# Patient Record
Sex: MALE | Race: WHITE
[De-identification: names, ages, dates, MRNs, and addresses within clinical notes are randomized per-mention and may not be internally consistent; named-entity substitution may affect disease eponyms.]

---

## 2017-01-13 ENCOUNTER — HOSPITAL ENCOUNTER (EMERGENCY)
Dept: HOSPITAL 36 - ER | Age: 52
Discharge: HOME | End: 2017-01-13
Payer: MEDICAID

## 2017-01-13 DIAGNOSIS — F17.200: ICD-10-CM

## 2017-01-13 DIAGNOSIS — H92.02: ICD-10-CM

## 2017-01-13 DIAGNOSIS — K02.9: Primary | ICD-10-CM

## 2017-01-13 PROCEDURE — 99283 EMERGENCY DEPT VISIT LOW MDM: CPT

## 2017-01-13 PROCEDURE — Z7610: HCPCS

## 2017-01-13 PROCEDURE — 96372 THER/PROPH/DIAG INJ SC/IM: CPT

## 2017-01-13 PROCEDURE — Z7502: HCPCS

## 2017-01-13 NOTE — ED PHYSICIAN CHART
Chief Complaint/HPI





- Patient Information


Date Seen:: 01/13/17


Time Seen:: 02:40


Chief Complaint:: dental pain and right ear pain


History of Present Illness:: 





increased for one week has had pain tooth 7 and right ear pain.


Allergies:: 


 Allergies











Allergy/AdvReac Type Severity Reaction Status Date / Time


 


No Known Allergies Allergy   Verified 08/02/16 03:35











Historian:: Patient


Review:: Nurse's Note Reviewed





Review of Systems





- Review of Systems


General/Constitutional: No fever, No chills


Skin: No skin lesions


Head: No headache


Eyes: No loss of vision


ENT: Other


Neck: No neck pain, No swelling


Cardio Vascular: No chest pain


Pulmonary: No SOB


GI: No nausea, No vomiting, No diarrhea


G/U: No dysuria


Musculoskeletal: No bone or joint pain


Endocrine: No polyuria, No polydipsia


Psychiatric: No prior psych history


Hematopoietic: No bruising





Past Medical History





- Past Medical History


Past Medical History: No significant medical hx


Family History: Diabetes Melitus


Social History: Smoker, No Alcohol


Surgical History: None


Psychiatricy History: None





Family Medical History





- Family Member


  ** Mother


History Unknown: Yes





Physical Exam





- Physical Examination


General/Constitutional: Well-developed, well-nourished, Alert


Head: Atraumatic


Eyes: Lids, conjuctiva normal


Skin: Nl inspection


ENMT: External ears, nose nl, TM canals nl, Nasal exam nl, Oropharynx nl, 

Tonsils nl


Other ENMT comments:: 





poor dental hygiene; tooth 7 carious to gum line


Neck: No nuchal rigidity


Respiratory: Nl effort/Exclusion, Clear to Auscultation


Cardio Vascular: RRR


GI: No tenderness/rebounding/guarding


: No CVA tenderness


Extremities: No tenderness or effusion


Neuro/Psych: Alert/oriented





Assessment





- Assessment


General Assessment: 





right ear pain is from radiation of dental pain





ED Septic Shock





- .


Is Septic Shock (SBP<90, OR Lactate>4 mmol\L) present?: No





Reassessment (Disposition)





- Diagnosis


Diagnosis:: 





dental caries and dental pain





- Aftercare/Follow up Instructions


Medication Prescribed:: 





amoxicillin 500 mg TID for 10 days; Ibuprofen 400 mg TID x 10 days with meals





- Patient Disposition


Discharge/Transfer:: Home


Condition at Disposition:: Stable, Improved

## 2017-06-08 ENCOUNTER — HOSPITAL ENCOUNTER (EMERGENCY)
Dept: HOSPITAL 36 - ER | Age: 52
Discharge: HOME | End: 2017-06-08
Payer: MEDICAID

## 2017-06-08 DIAGNOSIS — R51: Primary | ICD-10-CM

## 2017-06-08 DIAGNOSIS — A49.02: ICD-10-CM

## 2017-06-08 DIAGNOSIS — F17.200: ICD-10-CM

## 2017-06-08 PROCEDURE — 99283 EMERGENCY DEPT VISIT LOW MDM: CPT

## 2017-06-08 PROCEDURE — 96372 THER/PROPH/DIAG INJ SC/IM: CPT

## 2017-06-08 PROCEDURE — Z7502: HCPCS

## 2017-06-08 RX ADMIN — SULFAMETHOXAZOLE AND TRIMETHOPRIM ONE TAB: 800; 160 TABLET ORAL at 20:13

## 2017-06-08 NOTE — ED PHYSICIAN CHART
Chief Complaint/HPI





- Patient Information


Date Seen:: 06/08/17


Time Seen:: 19:15


Chief Complaint:: headache


History of Present Illness:: 





Patient developed a diffuse headache earlier today.  He noted a tender 

occipital nodule also.  Patient's had no chills or fever.


Allergies:: 


 Allergies











Allergy/AdvReac Type Severity Reaction Status Date / Time


 


No Known Allergies Allergy   Verified 08/02/16 03:35











Vitals:: 


 Vital Signs - 8 hr











  06/08/17





  19:00


 


Temp 98.7 F


 


HR 63


 


RR 16


 


/79


 


O2 Sat % 99











Historian:: Patient


Review:: Nurse's Note Reviewed





Review of Systems





- Review of Systems


General/Constitutional: No fever, No chills


Skin: No skin lesions


Head: Headache


Eyes: No loss of vision


ENT: No earache


Neck: No neck pain


Cardio Vascular: Palpitations, No palpitations


Pulmonary: No SOB, No cough


GI: No nausea, No vomiting


G/U: No dysuria


Musculoskeletal: No bone or joint pain


Psychiatric: No prior psych history, No depression, No anxiety


Hematopoietic: No bruising


Allergic/Immuno: No urticaria


Neurological: No syncope, No focal symptoms





Past Medical History





- Past Medical History


Past Medical History: No significant medical hx


Family History: Diabetes Melitus


Social History: Smoker, No Alcohol


Surgical History: None


Psychiatricy History: None


Medication: None





Family Medical History





- Family Member


  ** Mother


History Unknown: Yes


Ethnicity: Non-


Hx Family Cancer: No


Hx Family Hypertension: No





  ** Grandmother


Hx Family Diabetes: Yes





  ** Father


Hx Family Diabetes: Yes





Physical Exam





- Physical Examination


General/Constitutional: Well-developed, well-nourished, Alert, No distress


Head: Atraumatic


Eyes: Lids, conjuctiva normal, PERRL


Other Eyes comments:: 





Difficult to visualize optic disks but right optic disc appeared normal with 

sharp edges


Other Skin comments:: 





1 cm soft ill-defined tender nodule left occiput


ENMT: External ears, nose nl, TM canals nl, Nasal exam nl


Other ENMT comments:: 





3 out 4 gum retraction


Neck: No nuchal rigidity


Respiratory: Nl effort/Exclusion, Clear to Auscultation, No Wheeze/Rhonchi/Rales


Cardio Vascular: RRR


GI: No tenderness/rebounding/guarding, No organomegaly, No hernia, Normal BS's, 

Nondistended, No mass/bruits, No McBurney tenderness


: No CVA tenderness


Extremities: Normal digits & nails


Neuro/Psych: Alert/oriented, No focal deficits


Misc: Normal back, No paraspinal tenderness





Assessment





- Assessment


General Assessment: 





Patient's headache is improved at 8 PM.  The ill-defined left occipital nodule 

is probably MRSA.  I gave the patient instructions on washing the area with 

soap and also washing the back of his neck and between the shoulder blades in 

the back which should suppress the MRSA.





ED Septic Shock





- .


Is Septic Shock (SBP<90, OR Lactate>4 mmol\L) present?: No





- <6hrs of presentation:


Vital Signs: 


 Vital Signs - 8 hr











  06/08/17





  19:00


 


Temp 98.7 F


 


HR 63


 


RR 16


 


/79


 


O2 Sat % 99














Reassessment (Disposition)





- Reassessment


Reassessment Condition:: Improved





- Diagnosis


Diagnosis:: 





Cephalgia; occipital scalp MRSA





- Aftercare/Follow up Instructions


Aftercare/Follow-Up Instructions:: Refer to Discharge Instructions





- Patient Disposition


Discharge/Transfer:: Home


Condition at Disposition:: Stable, Improved

## 2018-06-17 ENCOUNTER — HOSPITAL ENCOUNTER (EMERGENCY)
Dept: HOSPITAL 36 - ER | Age: 53
Discharge: HOME | End: 2018-06-17
Payer: MEDICAID

## 2018-06-17 DIAGNOSIS — K04.7: Primary | ICD-10-CM

## 2018-06-17 PROCEDURE — Z7502: HCPCS

## 2018-06-17 PROCEDURE — Z7610: HCPCS

## 2018-06-17 NOTE — ED PHYSICIAN CHART
ED Chief Complaint/HPI





- Patient Information


Date Seen:: 06/17/18


Time Seen:: 21:20


Chief Complaint:: RT TOOTH PAIN


History of Present Illness:: 





53 YR OLD MALE WITH TOOTH PAIN THROBBING SHARP FOR LAST 2 DAYS IS SCARED ABOUT 

GOING TO THE DENTIST PT [facility] POOR DENTITION AND HAVING RT JAW SWELLING AND GUM 

PAIN


Allergies:: 


 Allergies











Allergy/AdvReac Type Severity Reaction Status Date / Time


 


No Known Allergies Allergy   Verified 06/17/18 21:09











Vitals:: 


 Vital Signs - 8 hr











  06/17/18





  21:06


 


Temp 98.9 F


 


HR 78


 


RR 18


 


/75


 


O2 Sat % 96














ED Review of Systems





- Review of Systems


Other: RT CHEEK PAIN RT GUM PAIN





ED Past Medical History





- Past Medical History


Past Medical History: No significant medical hx





Family Medical History





- Family Member


  ** Mother


History Unknown: Yes


Ethnicity: Non-


Hx Family Cancer: No


Hx Family Hypertension: No





  ** Grandmother


History Unknown: Yes


Hx Family Diabetes: Yes





  ** Father


History Unknown: Yes


Hx Family Diabetes: Yes





ED Physical Exam





- Physical Examination


General/Constitutional: Awake, Well-developed, well-nourished, Alert, No 

distress, GCS 15, Non-toxic appearing, Ambulatory


Head: Atraumatic


Eyes: Lids, conjuctiva normal, PERRL, EOMI


Skin: Nl inspection, No rash, No skin lesions, No ecchymosis, Well hydrated, No 

lymphadenopathy


ENMT: External ears, nose nl, Nasal exam nl, Lips, teeth, gums nl


Other ENMT comments:: 





BAD DENTITION WITH MISSING TEETH AND CARIES AND RT CHEEK SWELLING


Neck: Nontender, Full ROM w/o pain, No JVD, No nuchal rigidity, No bruit, No 

mass, No stridor


Respiratory: Nl effort/Exclusion, Clear to Auscultation, No Wheeze/Rhonchi/Rales


Cardio Vascular: RRR, No murmur, gallop, rubs, NL S1 S2


GI: No tenderness/rebounding/guarding, No organomegaly, No hernia, Normal BS's, 

Nondistended, No mass/bruits, No McBurney tenderness


: No CVA tenderness


Extremities: No tenderness or effusion, Full ROM, normal strength in all 

extremities, No edema, Normal digits & nails


Neuro/Psych: Alert/oriented, DTR's symmetric, Normal sensory exam, Normal motor 

strength, Judgement/insight normal, Mood normal, Normal gait, No focal deficits


Misc: Normal back, No paraspinal tenderness





ED Assessment





- Assessment


General Assessment: 





TOOTH ABSCESS RT CHEEK SWELLING





ED Septic Shock





- .


Is Septic Shock (SBP<90, OR Lactate>4 mmol\L) present?: No





- <6hrs of presentation:


Vital Signs: 


 Vital Signs - 8 hr











  06/17/18





  21:06


 


Temp 98.9 F


 


HR 78


 


RR 18


 


/75


 


O2 Sat % 96














ED Reassessment (Disposition)





- Reassessment


Reassessment Condition:: Improved





- Diagnosis


Diagnosis:: 





RT TOOTH PAIN ABSCESS AND CHEEK SWELLING





- Aftercare/Follow up Instructions


Medication Prescribed:: 





MOTRIN  KEFLEX AND PT GIVEN 500MG KEFLEX TIMES 2 PRIOR TO D/C





- Patient Disposition


Discharge/Transfer:: Home





ED Discharge Plan





- Patient Disposition


Instructions:  Dental Caries, Abscessed Tooth, Easy-to-Read


Additional Instructions: 


FILL YOUR PRESCRIPTION AND TAKE IT AS DIRECTED. FOLLOW UP WITH YOUR DENTIST 

THIS WEEK.

## 2018-11-04 ENCOUNTER — HOSPITAL ENCOUNTER (EMERGENCY)
Dept: HOSPITAL 36 - ER | Age: 53
Discharge: HOME | End: 2018-11-04
Payer: MEDICAID

## 2018-11-04 DIAGNOSIS — G89.29: ICD-10-CM

## 2018-11-04 DIAGNOSIS — F15.90: ICD-10-CM

## 2018-11-04 DIAGNOSIS — M54.5: Primary | ICD-10-CM

## 2018-11-04 LAB
AMPHET UR-MCNC: POSITIVE NG/ML
BARBITURATES UR-MCNC: NEGATIVE UG/ML
BENZODIAZEPINES PNL UR: NEGATIVE
CANNABINOIDS SERPL QL CFM: NEGATIVE
COCAINE METAB.OTHER UR-MCNC: NEGATIVE NG/ML
METHADONE UR CFM-MCNC: NEGATIVE NG/ML
METHAMPHET UR QL: POSITIVE
OPIATES UR QL: NEGATIVE
PCP UR-MCNC: NEGATIVE UG/L
TRICYCLICS UR QL: NEGATIVE

## 2018-11-04 PROCEDURE — 99284 EMERGENCY DEPT VISIT MOD MDM: CPT

## 2018-11-04 PROCEDURE — Z7502: HCPCS

## 2018-11-04 PROCEDURE — 96372 THER/PROPH/DIAG INJ SC/IM: CPT

## 2018-11-04 PROCEDURE — 80307 DRUG TEST PRSMV CHEM ANLYZR: CPT

## 2018-11-04 NOTE — ED PHYSICIAN CHART
ED Chief Complaint/HPI





- Patient Information


Date Seen:: 11/04/18


Time Seen:: 13:13


Chief Complaint:: chronic low back pain


History of Present Illness:: 





chronic low back pain.  no h/o trauma.  went to a pain clinic 2 years ago.  

wants something for pain.  looks like he is on drugs right now.





no change in bowel or bladder.


Allergies:: 


 Allergies











Allergy/AdvReac Type Severity Reaction Status Date / Time


 


No Known Allergies Allergy   Verified 11/04/18 13:13











Vitals:: 


 Vital Signs - 8 hr











  11/04/18





  13:13


 


Temp 97.9 F


 


HR 73


 


RR 19


 


/74


 


O2 Sat % 98











Historian:: Patient


Review:: Nurse's Note Reviewed





ED Review of Systems





- Review of Systems


General/Constitutional: No fever, No chills, No weight loss, No weakness, No 

diaphoresis, No edema, No loss of appetite


Skin: No skin lesions, No rash, No bruising


Head: No headache, No light-headedness


Eyes: No loss of vision, No pain, No diplopia


ENT: No earache, No nasal drainage, No sore throat, No tinnitus


Neck: No neck pain, No swelling, No thyromegaly, No stiffness, No mass noted


Cardio Vascular: No chest pain, No palpitations, No PND, No orthopnea, No edema


Pulmonary: No SOB, No cough, No sputum, No wheezing


GI: No nausea, No vomiting, No diarrhea, No pain, No melena, No hematochezia, 

No constipation, No hematemesis


G/U: No dysuria, No frequency, No hematuria


Musculoskeletal: Back pain


Endocrine: No polyuria, No polydipsia


Psychiatric: No prior psych history, No depression, No anxiety, No suicidal 

ideation


Hematopoietic: No bruising, No lymphadenopathy


Allergic/Immuno: No urticaria, No angioedema


Neurological: No syncope, No focal symptoms, No weakness, No paresthesia, No 

headache, No seizure, No dizziness, No confusion, No vertigo





ED Past Medical History





- Past Medical History


Obtainable: Yes


Past Medical History: Other (chronic low back pain.  has not been to a pain 

clinic for 2 years.)


Social History: Illicit Drug Use





Family Medical History





- Family Member


  ** Mother


History Unknown: Yes


Ethnicity: Non-


Hx Family Cancer: No


Hx Family Hypertension: No





  ** Grandmother


History Unknown: Yes


Hx Family Diabetes: Yes





  ** Father


History Unknown: Yes


Hx Family Diabetes: Yes





ED Physical Exam





- Physical Examination


General/Constitutional: Awake, Well-developed, well-nourished, Alert, GCS 15, 

Non-toxic appearing, Ambulatory


Other Gen/Cons comments:: 





 looks like he is on drugs right now.


Head: Atraumatic


Eyes: Lids, conjuctiva normal


Skin: Nl inspection, No rash


ENMT: External ears, nose nl


Neck: Nontender, No nuchal rigidity


Respiratory: Nl effort/Exclusion, Clear to Auscultation, No Wheeze/Rhonchi/Rales


Cardio Vascular: RRR, No murmur, gallop, rubs, NL S1 S2


: No CVA tenderness


Extremities: No tenderness or effusion, Full ROM


Neuro/Psych: Alert/oriented, Judgement/insight normal, Mood normal


Other Neuro/Psych comments:: 





 looks like he is on drugs right now.


Other Misc comments:: 





left sided paraspinal tenderness.  





mininmal to no muscle spasm.





negative straight leg raise.





full sensation.





no scars or deformities.





ED Labs/Radiology/EKG Results





- Lab Results


Results: 


 Laboratory Tests











  11/04/18





  13:31


 


Urine Opiates Screen  NEGATIVE


 


Urine Methadone Screen  NEGATIVE


 


Ur Barbiturates Screen  NEGATIVE


 


Ur Tricyclics Screen  NEGATIVE


 


Ur Phencyclidine Scrn  NEGATIVE


 


Amphetamines Screen  POSITIVE H


 


U Methamphetamines Scrn  POSITIVE H


 


U Benzodiazepines Scrn  NEGATIVE


 


U Cocaine Metab Screen  NEGATIVE


 


U Cannabinoids Screen  NEGATIVE














ED Assessment





- Assessment


General Assessment: 





refuses xrays of his back.  states that they were performed recently.





ED Septic Shock





- .


Is Septic Shock (SBP<90, OR Lactate>4 mmol\L) present?: No





- <6hrs of presentation:


Vital Signs: 


 Vital Signs - 8 hr











  11/04/18





  13:13


 


Temp 97.9 F


 


HR 73


 


RR 19


 


/74


 


O2 Sat % 98














ED Reassessment (Disposition)





- Reassessment


Reassessment Condition:: Improved





- Diagnosis


Diagnosis:: 





Chronic low back pain





Methamphetamine usage





- Aftercare/Follow up Instructions


Aftercare/Follow-Up Instructions:: Refer to Discharge Instructions


Notes:: 





follow up with your primary care physician if you continue to have problems for 

possible referral to a back specialist.


Medication Prescribed:: 





Flexeril 5 mg po tid # 15





- Patient Disposition


Discharge/Transfer:: Home


Condition at Disposition:: Stable, Improved